# Patient Record
Sex: MALE | Race: WHITE | NOT HISPANIC OR LATINO | Employment: FULL TIME
[De-identification: names, ages, dates, MRNs, and addresses within clinical notes are randomized per-mention and may not be internally consistent; named-entity substitution may affect disease eponyms.]

---

## 2018-08-23 ENCOUNTER — RECORDS - HEALTHEAST (OUTPATIENT)
Dept: ADMINISTRATIVE | Facility: OTHER | Age: 38
End: 2018-08-23

## 2018-11-12 ENCOUNTER — COMMUNICATION - HEALTHEAST (OUTPATIENT)
Dept: UROLOGY | Facility: CLINIC | Age: 38
End: 2018-11-12

## 2018-11-14 ENCOUNTER — OFFICE VISIT - HEALTHEAST (OUTPATIENT)
Dept: UROLOGY | Facility: CLINIC | Age: 38
End: 2018-11-14

## 2018-11-14 DIAGNOSIS — N13.2 HYDRONEPHROSIS WITH URINARY OBSTRUCTION DUE TO URETERAL CALCULUS: ICD-10-CM

## 2018-11-14 DIAGNOSIS — N20.0 CALCULUS OF KIDNEY: ICD-10-CM

## 2018-11-14 DIAGNOSIS — N20.1 CALCULUS OF URETER: ICD-10-CM

## 2018-11-14 LAB
ALBUMIN UR-MCNC: ABNORMAL MG/DL
APPEARANCE UR: ABNORMAL
BILIRUB UR QL STRIP: ABNORMAL
COLOR UR AUTO: ABNORMAL
GLUCOSE UR STRIP-MCNC: NEGATIVE MG/DL
HGB UR QL STRIP: ABNORMAL
KETONES UR STRIP-MCNC: ABNORMAL MG/DL
LEUKOCYTE ESTERASE UR QL STRIP: NEGATIVE
NITRATE UR QL: NEGATIVE
PH UR STRIP: 6 [PH] (ref 5–8)
SP GR UR STRIP: >=1.03 (ref 1–1.03)
UROBILINOGEN UR STRIP-ACNC: ABNORMAL

## 2018-11-14 ASSESSMENT — MIFFLIN-ST. JEOR: SCORE: 1698.71

## 2018-11-16 ENCOUNTER — ANESTHESIA - HEALTHEAST (OUTPATIENT)
Dept: SURGERY | Facility: CLINIC | Age: 38
End: 2018-11-16

## 2018-11-16 ENCOUNTER — SURGERY - HEALTHEAST (OUTPATIENT)
Dept: SURGERY | Facility: CLINIC | Age: 38
End: 2018-11-16

## 2018-11-16 ASSESSMENT — MIFFLIN-ST. JEOR: SCORE: 1677.16

## 2018-11-22 ENCOUNTER — ANESTHESIA - HEALTHEAST (OUTPATIENT)
Dept: SURGERY | Facility: CLINIC | Age: 38
End: 2018-11-22

## 2018-11-23 ENCOUNTER — SURGERY - HEALTHEAST (OUTPATIENT)
Dept: SURGERY | Facility: CLINIC | Age: 38
End: 2018-11-23

## 2018-11-23 ASSESSMENT — MIFFLIN-ST. JEOR: SCORE: 1676.03

## 2021-06-01 VITALS — BODY MASS INDEX: 22.32 KG/M2 | WEIGHT: 160 LBS

## 2021-06-02 VITALS — WEIGHT: 164 LBS | BODY MASS INDEX: 22.96 KG/M2 | HEIGHT: 71 IN

## 2021-06-02 VITALS — WEIGHT: 164.25 LBS | HEIGHT: 71 IN | BODY MASS INDEX: 22.99 KG/M2

## 2021-06-02 VITALS — HEIGHT: 71 IN | WEIGHT: 169 LBS | BODY MASS INDEX: 23.66 KG/M2

## 2021-06-16 PROBLEM — N13.2 HYDRONEPHROSIS WITH URINARY OBSTRUCTION DUE TO URETERAL CALCULUS: Status: ACTIVE | Noted: 2018-11-14

## 2021-06-16 PROBLEM — N20.0 CALCULUS OF KIDNEY: Status: ACTIVE | Noted: 2018-11-14

## 2021-06-16 PROBLEM — N20.1 CALCULUS OF URETER: Status: ACTIVE | Noted: 2018-11-14

## 2021-06-21 NOTE — ANESTHESIA POSTPROCEDURE EVALUATION
Patient: Blu Pizarro  #2  CYSTOSCOPY, RIGHT STENT REMOVAL  Anesthesia type: MAC    Patient location: Phase II Recovery  Last vitals:   Vitals:    11/23/18 0835   BP: 121/67   Pulse: 84   Resp: 14   Temp: 36.6  C (97.8  F)   SpO2: 97%     Post vital signs: stable  Level of consciousness: awake, alert and oriented  Post-anesthesia pain: pain controlled  Post-anesthesia nausea and vomiting: no  Pulmonary: unassisted, return to baseline  Cardiovascular: stable and blood pressure at baseline  Hydration: adequate  Anesthetic events: no    QCDR Measures:  ASA# 11 - Madeleine-op Cardiac Arrest: ASA11B - Patient did NOT experience unanticipated cardiac arrest  ASA# 12 - Madeleine-op Mortality Rate: ASA12B - Patient did NOT die  ASA# 13 - PACU Re-Intubation Rate: NA - No ETT / LMA used for case  ASA# 10 - Composite Anes Safety: ASA10A - No serious adverse event    Additional Notes:

## 2021-06-21 NOTE — ANESTHESIA PREPROCEDURE EVALUATION
Anesthesia Evaluation      Patient summary reviewed   No history of anesthetic complications     Airway   Mallampati: I  Neck ROM: full   Pulmonary - normal exam   (+) a smoker                         Cardiovascular - negative ROS and normal exam  Exercise tolerance: > or = 4 METS  (-) murmur  Rhythm: regular  Rate: normal,    no murmur   ROS comment: DVT  Factor V Leiden     Neuro/Psych - negative ROS     Endo/Other - negative ROS      GI/Hepatic/Renal    (+)   chronic renal disease (Kidney stones),           Dental    (+) chipped                       Anesthesia Plan  Planned anesthetic: MAC  Decadron  Zofran  ASA 2     Anesthetic plan and risks discussed with: patient  Anesthesia plan special considerations: antiemetics,   Post-op plan: routine recovery

## 2021-06-21 NOTE — ANESTHESIA POSTPROCEDURE EVALUATION
Patient: Blu Pizarro  CYSTOSCOPY, RIGHT URETEROSCOPY LASER LITHOTRIPSY AND STENT INSERTION  Anesthesia type: general    Patient location: PACU  Last vitals:   Vitals:    11/16/18 1315   BP: 107/60   Pulse: 66   Resp:    Temp:    SpO2: 97%     Post vital signs: stable  Level of consciousness: awake and responds to simple questions  Post-anesthesia pain: pain controlled  Post-anesthesia nausea and vomiting: no  Pulmonary: unassisted, return to baseline  Cardiovascular: stable and blood pressure at baseline  Hydration: adequate  Anesthetic events: no    QCDR Measures:  ASA# 11 - Madeleien-op Cardiac Arrest: ASA11B - Patient did NOT experience unanticipated cardiac arrest  ASA# 12 - Madeleine-op Mortality Rate: ASA12B - Patient did NOT die  ASA# 13 - PACU Re-Intubation Rate: ASA13B - Patient did NOT require a new airway mgmt  ASA# 10 - Composite Anes Safety: ASA10A - No serious adverse event    Additional Notes:

## 2021-06-21 NOTE — PROGRESS NOTES
Assessment/Plan:        Diagnoses and all orders for this visit:    Calculus of ureter  -     Place sequential compression device; Standing  -     Insert and maintain IV; Standing  -     sodium chloride bacteriostatic 0.9 % injection 0.1-0.3 mL; Inject 0.1-0.3 mL under the skin as needed (for IV insertion).        -     sodium chloride flush 3 mL (NS); Infuse 3 mL into a venous catheter Line Care.        -     Ureteroscopy Education  -     Patient Stated Goal: Know what to expect after surgery  -     Verify informed consent; Standing  -     Diet NPO; Standing  -     XR Abdomen AP; Standing  -     levoFLOXacin 500 mg/100 mL IVPB 500 mg (LEVAQUIN); Infuse 100 mL (500 mg total) into a venous catheter 60 minutes before surgery or procedure for Prior to Procedure (On Call to OR).          Hydronephrosis with urinary obstruction due to ureteral calculus    Calculus of kidney  -     Urinalysis Macroscopic    Other orders  -     ondansetron (ZOFRAN-ODT) 4 MG disintegrating tablet; Take 4 mg by mouth.  -     tamsulosin (FLOMAX) 0.4 mg cap; Take 0.4 mg by mouth.      Stone Management Plan  [facility] Stone [facility] 11/14/2018   Urinary Tract Infection No suspicion of infection   Renal Colic Well controlled symptoms   Renal Failure No suspicion of renal failure   Current CT date 11/10/2018   Right sided stones? Yes   R Number of ureteral stones 1   R GSD of ureteral stones 10   R Location of ureteral stone Proximal   R Number of kidney stones  1   R GSD of kidney stones 4 - 10   R Hydronephrosis Mild   R Stone Event New event   Diagnosis date 10/24/2018   Initial location of primary symptomatic stone Proximal   Initial GSD of primary symptomatic stone 10   R Current Plan Clear   Clear rationale Poor prognosis   Left sided stones? Yes   L Number of ureteral stones No ureteral stones   L Number of kidney stones  1   L GSD of kidney stones < 2   L Hydronephrosis None   L Stone Event No current event   L Current Plan Observe   Observe  "rationale Limited stone burden with good prognosis for spontaneous passage         Subjective:      HPI  Mr. Blu Pizarro is a 38 y.o.  male presenting to the [facility] [place] following recent  ED visit for urolithiasis.    He is an unidentified composition stone former. He states onset of stone disease in 2011 x 3 stone events, last occurring 1 year ago when he passed a \"6 mm\" stone.  He has no identified modifiable stone risk factors. He has no identified non-modifiable stone risk factors.    He was initially diagnosed with a \"6 mm\" right ureteral stone through [facility] ED visit 10/24/18 for right flank pain. He was sent home with flomax, zofran, and oxycodone. He was to follow up with Urology but did not yet do so. He had persistent and worsening right flank pain, prompting return to ED 4 days ago. The pain radiated into the right abdomen with no associated alleviating or aggravating factors. He had no associated symptoms. Labs demonstrated no acute signs for infection or renal impairment. Updated CT scan in \"conclusion\" section reported a 9 mm \"left\" mid ureteral stone with associated hydronephrosis and 6 mm right renal stone. He was sent home with ciprofloxacin for suspected UTI, but urine culture finalized no growth. He was also given percocet.    He states right sided pain, currently rating 7/10.  He has been managing pain with ibuprofen and as needed percocet. He is taking antibiotic but has not used flomax. He denies current symptoms of fever, chills, nausea, vomiting, urinary frequency and dysuria.     CT scan from 11/10/18 is personally reviewed and demonstrates a mildly obstructing 10 mm right proximal ureteral stone with ipsilateral 8 mm mid pole stone. There is a 1 mm left upper pole renal stone.    Significant labs from presentation include severe hematuria, mild pyuria, negative nitrite, few bacteria, no growth on urine culture, normal WBC, normal creatinine and normal " potassium.    PLAN    39 yo M first time stone former with obstructing, large right proximal ureteral stone and associated recurrent renal colic. Bilateral renal stones.    Will proceed with ureterscopic stone clearance this week. Risks and benefits were detailed of ureteroscopic stone clearance including potential issues of urinary or systemic infection, ureteral injury, inaccessible stone, incomplete stone clearance, multiple surgeries, and stent related symptoms of urgency, frequency and hematuria Patient verbalized understanding. Patient agrees with plan as discussed. Preoperative evaluation with primary care is not requested as the referring documentation is adequate.    For symptom control, he has oxycodone, ondansetron and flomax. He can stop antibiotic. Over the counter symptom control medications of ibuprofen, Dramamine and Tylenol were recommended.     Patient also seen and examined by SLADE Caldwell   Review of Systems  A 12 point comprehensive review of systems is negative except for HPI    Past Medical History:   Diagnosis Date     DVT (deep venous thrombosis) (H)      Factor V Leiden (H)      Kidney stones     first stone at age 31 in 2011       No past surgical history on file.    Current Outpatient Medications   Medication Sig Dispense Refill     ciprofloxacin HCl (CIPRO) 500 MG tablet Take 1 tablet (500 mg total) by mouth 2 (two) times a day for 5 days. 10 tablet 0     ibuprofen (ADVIL,MOTRIN) 600 MG tablet Take 1 tablet (600 mg total) by mouth every 6 (six) hours as needed. 28 tablet 0     ondansetron (ZOFRAN-ODT) 4 MG disintegrating tablet Take 4 mg by mouth.       oxyCODONE-acetaminophen (PERCOCET/ENDOCET) 5-325 mg per tablet Take 1 tablet by mouth every 6 (six) hours as needed for pain. 13 tablet 0     tamsulosin (FLOMAX) 0.4 mg cap Take 0.4 mg by mouth.       No current facility-administered medications for this visit.        No Known Allergies    Social History     Socioeconomic  History     Marital status: Single     Spouse name: Not on file     Number of children: Not on file     Years of education: Not on file     Highest education level: Not on file   Social Needs     Financial resource strain: Not on file     Food insecurity - worry: Not on file     Food insecurity - inability: Not on file     Transportation needs - medical: Not on file     Transportation needs - non-medical: Not on file   Occupational History     Occupation: vent    Tobacco Use     Smoking status: Former Smoker     Types: Cigarettes     Last attempt to quit: 11/11/2018     Smokeless tobacco: Never Used     Tobacco comment: Trying to quit    Substance and Sexual Activity     Alcohol use: Yes     Drug use: No     Sexual activity: Not on file   Other Topics Concern     Not on file   Social History Narrative     Not on file       Family History   Problem Relation Age of Onset     Heart disease Father      Cancer Maternal Aunt         breast      Cancer Paternal Aunt         lung cancer     Cancer Paternal Grandmother         unknown     Heart disease Paternal Grandmother      Cancer Paternal Grandfather         unknown       Objective:      Physical Exam  Vitals:    11/14/18 1448   BP: 131/62   Pulse: 87   Temp: 98.2  F (36.8  C)     General - well developed, well nourished, appropriate for age. Appears no distress at this time   Heart - regular rate and rhythm, no murmur  Respiratory - normal effort, clear to auscultation, good air entry without adventitious noises  Abdomen - slender soft, non-tender, no hepatosplenomegaly, no masses.   - no flank tenderness, no suprapubic tenderness, kidney and bladder non-palpable  MSK - normal spinal curvature. no spinal tenderness. normal gait. muscular strength intact.  Neurology - cranial nerves II-XII grossly intact, normal sensation, no unsteadiness  Skin - intact, no bruising, no gouty tophi  Psych - oriented to time, place, and person, normal mood and  affect.    Labs  Urinalysis POC (Office):  Nitrite, UA   Date Value Ref Range Status   11/14/2018 Negative Negative Final   11/10/2018 Negative Negative Final       Lab Urinalysis:  Blood, UA   Date Value Ref Range Status   11/14/2018 Large (!) Negative Final   11/10/2018 Large (!) Negative Final     Nitrite, UA   Date Value Ref Range Status   11/14/2018 Negative Negative Final   11/10/2018 Negative Negative Final     Leukocytes, UA   Date Value Ref Range Status   11/14/2018 Negative Negative Final   11/10/2018 Small (!) Negative Final     pH, UA   Date Value Ref Range Status   11/14/2018 6.0 5.0 - 8.0 Final   11/10/2018 7.0 4.5 - 8.0 Final    and Acute Labs   CBC   WBC   Date Value Ref Range Status   11/10/2018 6.9 4.0 - 11.0 thou/uL Final     Hemoglobin   Date Value Ref Range Status   11/10/2018 15.8 14.0 - 18.0 g/dL Final     Platelets   Date Value Ref Range Status   11/10/2018 229 140 - 440 thou/uL Final   , Renal Panel  [facility]  Creatinine   Date Value Ref Range Status   11/10/2018 0.89 0.70 - 1.30 mg/dL Final     Potassium   Date Value Ref Range Status   11/10/2018 3.4 (L) 3.5 - 5.0 mmol/L Final     Calcium   Date Value Ref Range Status   11/10/2018 9.1 8.5 - 10.5 mg/dL Final    and Urine Culture    Culture   Date Value Ref Range Status   11/10/2018 No Growth  Final         I, Pete Castellanos, personally saw and examined the patient, reviewed most recent labs and imaging and agree with the above assessment

## 2021-06-21 NOTE — ANESTHESIA CARE TRANSFER NOTE
Last vitals:   Vitals:    11/16/18 1137   BP: 126/68   Pulse: 80   Resp: 16   Temp: 36.9  C (98.5  F)   SpO2: 98%     Patient's level of consciousness is awake  Spontaneous respirations: yes  Maintains airway independently: yes  Dentition unchanged: yes  Oropharynx: oropharynx clear of all foreign objects    QCDR Measures:  ASA# 20 - Surgical Safety Checklist: WHO surgical safety checklist completed prior to induction    PQRS# 430 - Adult PONV Prevention: 4558F - Pt received => 2 anti-emetic agents (different classes) preop & intraop  ASA# 8 - Peds PONV Prevention: NA - Not pediatric patient, not GA or 2 or more risk factors NOT present  PQRS# 424 - Madeleine-op Temp Management: 4559F - At least one body temp DOCUMENTED => 35.5C or 95.9F within required timeframe  PQRS# 426 - PACU Transfer Protocol: - Transfer of care checklist used  ASA# 14 - Acute Post-op Pain: ASA14B - Patient did NOT experience pain >= 7 out of 10